# Patient Record
Sex: MALE | Race: WHITE | Employment: FULL TIME | URBAN - NONMETROPOLITAN AREA
[De-identification: names, ages, dates, MRNs, and addresses within clinical notes are randomized per-mention and may not be internally consistent; named-entity substitution may affect disease eponyms.]

---

## 2020-07-27 ENCOUNTER — HOSPITAL ENCOUNTER (EMERGENCY)
Age: 45
Discharge: HOME | End: 2020-07-27
Attending: EMERGENCY MEDICINE
Payer: COMMERCIAL

## 2020-07-27 ENCOUNTER — APPOINTMENT (OUTPATIENT)
Dept: GENERAL RADIOLOGY | Age: 45
End: 2020-07-27
Payer: COMMERCIAL

## 2020-07-27 VITALS
SYSTOLIC BLOOD PRESSURE: 139 MMHG | RESPIRATION RATE: 16 BRPM | HEART RATE: 88 BPM | OXYGEN SATURATION: 98 % | WEIGHT: 250 LBS | TEMPERATURE: 98.4 F | DIASTOLIC BLOOD PRESSURE: 82 MMHG

## 2020-07-27 PROCEDURE — 6360000002 HC RX W HCPCS: Performed by: EMERGENCY MEDICINE

## 2020-07-27 PROCEDURE — 6370000000 HC RX 637 (ALT 250 FOR IP): Performed by: EMERGENCY MEDICINE

## 2020-07-27 PROCEDURE — 12001 RPR S/N/AX/GEN/TRNK 2.5CM/<: CPT

## 2020-07-27 PROCEDURE — 99283 EMERGENCY DEPT VISIT LOW MDM: CPT

## 2020-07-27 PROCEDURE — 90471 IMMUNIZATION ADMIN: CPT | Performed by: EMERGENCY MEDICINE

## 2020-07-27 PROCEDURE — 90715 TDAP VACCINE 7 YRS/> IM: CPT | Performed by: EMERGENCY MEDICINE

## 2020-07-27 PROCEDURE — 2709999900 HC NON-CHARGEABLE SUPPLY

## 2020-07-27 PROCEDURE — 2500000003 HC RX 250 WO HCPCS: Performed by: EMERGENCY MEDICINE

## 2020-07-27 PROCEDURE — 73140 X-RAY EXAM OF FINGER(S): CPT

## 2020-07-27 RX ORDER — CEPHALEXIN 500 MG/1
500 CAPSULE ORAL ONCE
Status: COMPLETED | OUTPATIENT
Start: 2020-07-27 | End: 2020-07-27

## 2020-07-27 RX ORDER — LIDOCAINE HYDROCHLORIDE 10 MG/ML
5 INJECTION, SOLUTION INFILTRATION; PERINEURAL ONCE
Status: COMPLETED | OUTPATIENT
Start: 2020-07-27 | End: 2020-07-27

## 2020-07-27 RX ORDER — CEPHALEXIN 500 MG/1
500 CAPSULE ORAL 3 TIMES DAILY
Qty: 30 CAPSULE | Refills: 0 | Status: SHIPPED | OUTPATIENT
Start: 2020-07-27 | End: 2020-08-06

## 2020-07-27 RX ADMIN — TETANUS TOXOID, REDUCED DIPHTHERIA TOXOID AND ACELLULAR PERTUSSIS VACCINE, ADSORBED 0.5 ML: 5; 2.5; 8; 8; 2.5 SUSPENSION INTRAMUSCULAR at 01:50

## 2020-07-27 RX ADMIN — CEPHALEXIN 500 MG: 500 CAPSULE ORAL at 02:50

## 2020-07-27 RX ADMIN — LIDOCAINE HYDROCHLORIDE 5 ML: 10 INJECTION, SOLUTION INFILTRATION; PERINEURAL at 01:51

## 2020-07-27 ASSESSMENT — PAIN SCALES - GENERAL: PAINLEVEL_OUTOF10: 0

## 2020-07-27 NOTE — ED PROVIDER NOTES
[facility]  eMERGENCY dEPARTMENT eNCOUnter             Mirian Fischer 17 COMPLAINT    Chief Complaint   Patient presents with    Laceration     right 5th finger       Nurses Notes reviewed and I agree except as noted in the HPI. HPI    Ashley Juan Ramon Xiao is a 53 y.o. male who presents  Laceration to the right little finger, sustained just prior to arrival when he was working in his finger got pinched in a wrench. Has a laceration of the finger pad which extends about two thirds of the rail around the tip of the finger and is gaping. Pain is 3/10, aching, throbbing, worse with movement or palpation. No other injury sustained. He does not know when he last had a tetanus shot. REVIEW OF SYSTEMS      Review of Systems   Constitutional: Negative. Neurological: Negative for sensory change. Endo/Heme/Allergies: Does not bruise/bleed easily. All other systems reviewed and are negative. PAST MEDICAL HISTORY     has no past medical history on file. SURGICAL HISTORY     has no past surgical history on file. CURRENT MEDICATIONS    Previous Medications    No medications on file       ALLERGIES    has No Known Allergies. FAMILY HISTORY    has no family status information on file. family history is not on file. SOCIAL HISTORY     reports that he has never smoked. He has never used smokeless tobacco. He reports that he does not drink alcohol or use drugs. PHYSICAL EXAM       INITIAL VITALS: /82   Pulse 88   Temp 98.4 °F (36.9 °C)   Resp 16   Wt 250 lb (113.4 kg)   SpO2 98%    Physical Exam  Vitals signs and nursing note reviewed. Constitutional:       General: He is not in acute distress. HENT:      Head: Atraumatic. Cardiovascular:      Pulses: Normal pulses. Musculoskeletal: Normal range of motion. Comments: 2 cm flap laceration to the distal phalanx finger pad of the right little finger, gaping, oozing.     Skin:     General: Skin is warm and dry. Capillary Refill: Capillary refill takes less than 2 seconds. Neurological:      General: No focal deficit present. Mental Status: He is alert and oriented to person, place, and time. Psychiatric:         Behavior: Behavior normal.             RADIOLOGY:    XR FINGER LEFT (MIN 2 VIEWS)   Final Result    Comminuted mildly displaced fracture of the tuft of the fifth distal phalanx with overlying skin laceration. **This report has been created using voice recognition software. It may contain minor errors which are inherent in voice recognition technology. **      Final report electronically signed by Dr. Katie Harden on 7/27/2020 1:58 AM            Vitals:    Vitals:    07/27/20 0144   BP: 139/82   Pulse: 88   Resp: 16   Temp: 98.4 °F (36.9 °C)   SpO2: 98%   Weight: 250 lb (113.4 kg)       EMERGENCY DEPARTMENT COURSE:    Boostrix given. Wound repaired as below. Oral Keflex given. Dressing and splint applied. X-ray results, concept of open fracture and risk for infection discussed. PROCEDURES:    Consent:  Verbal, patient  Wound Location:  Right little finger  Wound Description:   Partially avulsed flap,  Finger pad, into finger pad fat, distal phalanx tip with tuft fracture exposed. Wound Length: 2 cm  Antiseptic prep solution:   Wound wash solution , copiously sprayed to irrigate  Sterile drapes use, aseptic technique used throughout. Local anesthetic:   1% lidocaine, 4 mL  Debridement:  Moderate, with removal of dirt and debris from wound edge , irrigation  Sutures:   4-0 Ethilon, #6, superficial interrupted, to re-approximate avulsed flap  Dressing:  Dry sterile, with protective , cagelike splint , placed by the nurse, normal vascular status before and after placement,  Finger numb due to local anesthetic injection  Adacel:   given  Antibiotic:   cephalexin      FINAL IMPRESSION      1.  Open displaced fracture of distal phalanx of right little finger, initial encounter    2. Laceration of right little finger without foreign body without damage to nail, initial encounter        DISPOSITION/PLAN    DISPOSITION Decision To Discharge 07/27/2020 02:27:03 AM      PATIENT REFERRED TO:    [facility], [place]  [address]  [phone]    return for wound recheck 7/29/2020. Suture removal 10-14 days.       DISCHARGE MEDICATIONS:    New Prescriptions    CEPHALEXIN (KEFLEX) 500 MG CAPSULE    Take 1 capsule by mouth 3 times daily for 10 days          (Please note that portions of this note were completed with a voice recognition program.  Efforts were made to edit the dictations but occasionally words are mis-transcribed.)      Dixei Dolan MD  07/27/20 6154

## 2020-07-27 NOTE — ED NOTES
Pt instructed on care of wound and use of splint. Pt given discharge instructions and Norco starter pack for home use. Pt verbalized understanding of all and left ambulatory per self. Pt in stable condition.       Simba Schwartz RN  07/27/20 3228

## 2020-07-27 NOTE — ED TRIAGE NOTES
Pt states working on a line at work and caught right 5th finger with wrench. Laceration noted to distal finger.

## 2020-07-29 ENCOUNTER — HOSPITAL ENCOUNTER (OUTPATIENT)
Dept: GENERAL RADIOLOGY | Age: 45
Discharge: HOME | End: 2020-07-29
Payer: COMMERCIAL

## 2020-07-29 ENCOUNTER — HOSPITAL ENCOUNTER (EMERGENCY)
Age: 45
Discharge: HOME | End: 2020-07-29
Attending: EMERGENCY MEDICINE
Payer: COMMERCIAL

## 2020-07-29 VITALS
DIASTOLIC BLOOD PRESSURE: 91 MMHG | OXYGEN SATURATION: 96 % | RESPIRATION RATE: 16 BRPM | BODY MASS INDEX: 31.32 KG/M2 | WEIGHT: 244 LBS | HEIGHT: 74 IN | SYSTOLIC BLOOD PRESSURE: 128 MMHG | TEMPERATURE: 97.5 F | HEART RATE: 96 BPM

## 2020-07-29 VITALS
BODY MASS INDEX: 31.32 KG/M2 | RESPIRATION RATE: 16 BRPM | HEIGHT: 74 IN | HEART RATE: 96 BPM | DIASTOLIC BLOOD PRESSURE: 91 MMHG | TEMPERATURE: 97.5 F | WEIGHT: 244 LBS | OXYGEN SATURATION: 96 % | SYSTOLIC BLOOD PRESSURE: 128 MMHG

## 2020-07-29 PROCEDURE — 99212 OFFICE O/P EST SF 10 MIN: CPT

## 2020-07-29 PROCEDURE — 99282 EMERGENCY DEPT VISIT SF MDM: CPT

## 2020-07-29 PROCEDURE — 99281 EMR DPT VST MAYX REQ PHY/QHP: CPT

## 2020-07-29 NOTE — PROGRESS NOTES
Pt pink, warm and dry, breathing with ease. Right little finger sutures intact, no redness, edema or drainage. Instructed to use finger splint at all times. Bandaid applied to finger. Instructed on daily wound care. AVS reviewed including follow up appointments, diagnosis, and care of self at home. Denies questions or concerns. Pt remains alert and oriented. Pt discharged in stable condition.

## 2020-07-29 NOTE — ED NOTES
Pt was to be an outpt workman's comp recheck. This chart entry removed and pt put in as an outpt.       Marcos Ferris RN  07/29/20 3388

## 2020-07-29 NOTE — ED PROVIDER NOTES
[facility]  eMERGENCY dEPARTMENT eNCOUnter             Mirian Fischer 17 COMPLAINT    Chief Complaint   Patient presents with    Wound Check     Pt had sutures put in right little finger 2 days ago. Tip is fractured. Nurses Notes reviewed and I agree except as noted in the HPI. HPI    Ashley Rosa is a 53 y.o. male who presents for wound recheck. On 7/27, he sustained a crush injury to the tip of his right small finger, with a tuft fracture that was open, with overlying laceration. The wound was sutured after copious cleansing of the wound. 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  He is taking Keflex. Over-the-counter pain medication is effective. REVIEW OF SYSTEMS      Review of Systems   All other systems reviewed and are negative. PAST MEDICAL HISTORY     has no past medical history on file. SURGICAL HISTORY     has no past surgical history on file. CURRENT MEDICATIONS    Previous Medications    CEPHALEXIN (KEFLEX) 500 MG CAPSULE    Take 1 capsule by mouth 3 times daily for 10 days       ALLERGIES    has No Known Allergies. FAMILY HISTORY    has no family status information on file. family history is not on file. SOCIAL HISTORY     reports that he has never smoked. He has never used smokeless tobacco. He reports that he does not drink alcohol or use drugs. PHYSICAL EXAM       INITIAL VITALS: BP (!) 128/91   Pulse 96   Temp 97.5 °F (36.4 °C) (Temporal)   Resp 16   Ht 6' 2\" (1.88 m)   Wt 244 lb (110.7 kg)   SpO2 96%   BMI 31.33 kg/m²      Physical Exam  Vitals signs and nursing note reviewed. Constitutional:       General: He is not in acute distress. Musculoskeletal: Normal range of motion.       Comments: Healing, sutured laceration to the right little fingertip, tissue distal to the sutured laceration is pink with normal capillary refill. Minimal swelling. Minimal pain. No evidence of infection. Skin:     General: Skin is warm and dry. Capillary Refill: Capillary refill takes less than 2 seconds. Neurological:      General: No focal deficit present. Mental Status: He is alert and oriented to person, place, and time. Psychiatric:         Behavior: Behavior normal.             Vitals:    Vitals:    07/29/20 0727   BP: (!) 128/91   Pulse: 96   Resp: 16   Temp: 97.5 °F (36.4 °C)   TempSrc: Temporal   SpO2: 96%   Weight: 244 lb (110.7 kg)   Height: 6' 2\" (1.88 m)       EMERGENCY DEPARTMENT COURSE:    Will continue current work restrictions and wound care. Follow-up here on 8/6/20 for suture removal.      FINAL IMPRESSION      1.  Laceration of right little finger without foreign body without damage to nail, subsequent encounter        DISPOSITION/PLAN    DISPOSITION Decision To Discharge 07/29/2020 07:36:58 AM      PATIENT REFERRED TO:    [address]  [phone]    8/6/2020 for suture removal      DISCHARGE MEDICATIONS:    New Prescriptions    No medications on file          (Please note that portions of this note were completed with a voice recognition program.  Efforts were made to edit the dictations but occasionally words are mis-transcribed.)  Is as as well as no related      Mary Delgado MD  07/29/20 8415

## 2020-08-06 ENCOUNTER — HOSPITAL ENCOUNTER (OUTPATIENT)
Age: 45
Discharge: HOME | End: 2020-08-06
Payer: COMMERCIAL